# Patient Record
Sex: FEMALE | Race: WHITE | NOT HISPANIC OR LATINO | Employment: OTHER | URBAN - METROPOLITAN AREA
[De-identification: names, ages, dates, MRNs, and addresses within clinical notes are randomized per-mention and may not be internally consistent; named-entity substitution may affect disease eponyms.]

---

## 2017-02-08 PROBLEM — I50.30: Chronic | Status: ACTIVE | Noted: 2017-02-08

## 2017-02-08 PROBLEM — M85.80 OSTEOPENIA DETERMINED BY X-RAY: Chronic | Status: ACTIVE | Noted: 2017-02-08

## 2017-02-08 PROBLEM — K76.0 FATTY LIVER DISEASE, NONALCOHOLIC: Chronic | Status: ACTIVE | Noted: 2017-02-08

## 2017-02-08 PROBLEM — I42.9: Chronic | Status: ACTIVE | Noted: 2017-02-08

## 2017-02-08 PROBLEM — I27.20 PULMONARY HTN: Status: ACTIVE | Noted: 2017-02-08

## 2017-02-09 PROBLEM — N18.9 ANEMIA IN CHRONIC KIDNEY DISEASE(285.21): Status: ACTIVE | Noted: 2017-02-09

## 2017-02-09 PROBLEM — I27.20 PULMONARY HTN: Chronic | Status: ACTIVE | Noted: 2017-02-08

## 2017-02-09 PROBLEM — D63.1 ANEMIA IN CHRONIC KIDNEY DISEASE(285.21): Status: ACTIVE | Noted: 2017-02-09

## 2017-02-09 PROBLEM — R74.8 ABNORMAL LIVER ENZYMES: Status: ACTIVE | Noted: 2017-02-09

## 2017-02-10 PROBLEM — D63.1 ANEMIA IN CHRONIC KIDNEY DISEASE(285.21): Status: ACTIVE | Noted: 2017-02-10

## 2017-02-10 PROBLEM — N18.9 ANEMIA IN CHRONIC KIDNEY DISEASE(285.21): Status: ACTIVE | Noted: 2017-02-10

## 2017-02-10 PROBLEM — R74.8 ABNORMAL LIVER ENZYMES: Status: ACTIVE | Noted: 2017-02-10

## 2017-02-12 PROBLEM — R00.1 BRADYCARDIA, DRUG INDUCED: Status: ACTIVE | Noted: 2017-02-12

## 2017-02-12 PROBLEM — T50.905A BRADYCARDIA, DRUG INDUCED: Status: ACTIVE | Noted: 2017-02-12

## 2017-02-13 PROBLEM — R00.1 BRADYCARDIA: Status: ACTIVE | Noted: 2017-02-13

## 2017-02-13 PROBLEM — E44.0 MALNUTRITION OF MODERATE DEGREE: Status: ACTIVE | Noted: 2017-02-13

## 2017-02-17 PROBLEM — E63.8 INADEQUATE DIETARY INTAKE OF PROTEIN: Status: ACTIVE | Noted: 2017-02-17

## 2017-02-24 PROBLEM — D63.1 ANEMIA IN CHRONIC KIDNEY DISEASE(285.21): Chronic | Status: ACTIVE | Noted: 2017-02-10

## 2017-02-24 PROBLEM — N18.9 ANEMIA IN CHRONIC KIDNEY DISEASE(285.21): Chronic | Status: ACTIVE | Noted: 2017-02-10

## 2017-03-27 PROBLEM — E83.52 HYPERCALCEMIA: Status: ACTIVE | Noted: 2017-03-27

## 2017-03-27 PROBLEM — E63.9 INADEQUATE DIETARY ENERGY INTAKE: Status: ACTIVE | Noted: 2017-03-27

## 2017-03-27 PROBLEM — K92.2 GASTROINTESTINAL HEMORRHAGE: Status: ACTIVE | Noted: 2017-03-27

## 2017-03-27 PROBLEM — E87.5 HYPERKALEMIA: Status: ACTIVE | Noted: 2017-03-27

## 2017-03-28 PROBLEM — R33.9 URINARY RETENTION: Status: ACTIVE | Noted: 2017-03-28

## 2017-03-28 PROBLEM — R82.71 BACTERIURIA WITH PYURIA: Status: ACTIVE | Noted: 2017-03-28

## 2017-03-28 PROBLEM — R82.81 BACTERIURIA WITH PYURIA: Status: ACTIVE | Noted: 2017-03-28

## 2017-03-29 PROBLEM — E87.5 HYPERKALEMIA: Status: RESOLVED | Noted: 2017-03-27 | Resolved: 2017-03-29

## 2017-03-29 PROBLEM — N39.0 UTI (URINARY TRACT INFECTION): Status: ACTIVE | Noted: 2017-03-28

## 2017-03-29 PROBLEM — E83.52 HYPERCALCEMIA: Status: RESOLVED | Noted: 2017-03-27 | Resolved: 2017-03-29

## 2017-03-31 PROBLEM — K92.2 GASTROINTESTINAL HEMORRHAGE: Status: RESOLVED | Noted: 2017-03-27 | Resolved: 2017-03-31

## 2017-04-18 PROBLEM — L08.9 WOUND INFECTION: Status: ACTIVE | Noted: 2017-04-18

## 2017-04-18 PROBLEM — T14.8XXA WOUND INFECTION: Status: ACTIVE | Noted: 2017-04-18

## 2017-04-21 PROBLEM — M86.172 ACUTE OSTEOMYELITIS OF LEFT CALCANEUS: Status: ACTIVE | Noted: 2017-04-21

## 2017-08-15 PROBLEM — N28.9 ACUTE RENAL INSUFFICIENCY: Status: ACTIVE | Noted: 2017-08-15

## 2017-08-15 PROBLEM — M00.9 SEPTIC JOINT: Status: ACTIVE | Noted: 2017-08-15

## 2017-08-16 PROBLEM — E87.20 METABOLIC ACIDOSIS, NORMAL ANION GAP (NAG): Status: ACTIVE | Noted: 2017-08-16

## 2017-08-16 PROBLEM — L89.624 PRESSURE ULCER OF LEFT HEEL, STAGE 4: Status: ACTIVE | Noted: 2017-08-16

## 2017-08-16 PROBLEM — A04.72 C. DIFFICILE COLITIS: Status: ACTIVE | Noted: 2017-08-16

## 2017-08-16 PROBLEM — L89.890: Status: ACTIVE | Noted: 2017-08-16

## 2017-08-16 PROBLEM — L89.894: Status: ACTIVE | Noted: 2017-08-16

## 2017-08-16 PROBLEM — L89.93 PRESSURE ULCER, STAGE 3: Status: ACTIVE | Noted: 2017-08-16

## 2017-08-21 PROBLEM — E87.6 HYPOKALEMIA: Status: ACTIVE | Noted: 2017-08-21

## 2017-08-22 PROBLEM — M86.672 CHRONIC OSTEOMYELITIS INVOLVING LEFT ANKLE AND FOOT: Status: ACTIVE | Noted: 2017-04-21

## 2017-08-22 PROBLEM — L89.894: Status: ACTIVE | Noted: 2017-08-22

## 2017-08-22 PROBLEM — N28.9 ACUTE RENAL INSUFFICIENCY: Status: RESOLVED | Noted: 2017-08-15 | Resolved: 2017-08-22

## 2017-09-03 PROBLEM — I21.4 NSTEMI (NON-ST ELEVATED MYOCARDIAL INFARCTION): Status: ACTIVE | Noted: 2017-09-03

## 2017-09-04 PROBLEM — G93.40 ENCEPHALOPATHY: Status: ACTIVE | Noted: 2017-09-04

## 2017-09-07 PROBLEM — J90 BILATERAL PLEURAL EFFUSION: Status: ACTIVE | Noted: 2017-09-07

## 2018-02-19 PROBLEM — N39.0 URINARY TRACT INFECTION WITHOUT HEMATURIA: Status: ACTIVE | Noted: 2018-02-19

## 2018-02-20 PROBLEM — E43 SEVERE MALNUTRITION: Status: ACTIVE | Noted: 2018-02-20

## 2018-02-20 PROBLEM — R50.9 FEVER: Status: ACTIVE | Noted: 2018-02-20

## 2018-02-20 PROBLEM — I42.9: Status: ACTIVE | Noted: 2017-02-08

## 2018-02-20 PROBLEM — E86.0 DEHYDRATION: Status: ACTIVE | Noted: 2018-02-20

## 2018-02-20 PROBLEM — I50.30: Status: ACTIVE | Noted: 2017-02-08

## 2018-02-21 PROBLEM — L89.90 PRESSURE ULCER: Status: ACTIVE | Noted: 2018-02-21

## 2018-02-21 PROBLEM — T14.8XXA DEEP TISSUE INJURY: Status: ACTIVE | Noted: 2018-02-21

## 2018-02-23 PROBLEM — D64.9 NORMOCYTIC ANEMIA: Status: ACTIVE | Noted: 2018-02-23

## 2018-03-03 PROBLEM — N12 PYELONEPHRITIS: Status: ACTIVE | Noted: 2018-03-03

## 2018-03-04 PROBLEM — J96.02 ACUTE RESPIRATORY FAILURE WITH HYPOXIA AND HYPERCARBIA: Status: ACTIVE | Noted: 2018-03-04

## 2018-03-04 PROBLEM — J96.01 ACUTE RESPIRATORY FAILURE WITH HYPOXIA AND HYPERCARBIA: Status: ACTIVE | Noted: 2018-03-04

## 2018-03-05 PROBLEM — R05.9 COUGH: Status: ACTIVE | Noted: 2018-03-05

## 2018-03-05 PROBLEM — R40.4 ALTERED AWARENESS, TRANSIENT: Status: ACTIVE | Noted: 2018-03-05

## 2018-03-06 PROBLEM — L25.8 DERMATITIS ASSOCIATED WITH INCONTINENCE: Status: ACTIVE | Noted: 2018-03-06

## 2018-03-06 PROBLEM — L89.894: Status: ACTIVE | Noted: 2018-03-06

## 2018-03-06 PROBLEM — R32 DERMATITIS ASSOCIATED WITH INCONTINENCE: Status: ACTIVE | Noted: 2018-03-06

## 2018-03-06 PROBLEM — L89.92 PRESSURE INJURY OF SKIN, STAGE 2: Status: ACTIVE | Noted: 2018-03-06

## 2018-03-06 PROBLEM — L89.620 UNSTAGEABLE PRESSURE ULCER OF LEFT HEEL: Status: ACTIVE | Noted: 2018-03-06

## 2018-03-06 PROBLEM — L89.893: Status: ACTIVE | Noted: 2018-03-06

## 2018-03-09 PROBLEM — E87.0 HYPERNATREMIA: Status: ACTIVE | Noted: 2018-03-09

## 2018-03-10 PROBLEM — E87.0 HYPERNATREMIA: Chronic | Status: ACTIVE | Noted: 2018-03-09

## 2018-03-10 PROBLEM — D64.9 NORMOCYTIC ANEMIA: Chronic | Status: ACTIVE | Noted: 2018-02-23

## 2018-03-10 PROBLEM — E43 SEVERE MALNUTRITION: Chronic | Status: ACTIVE | Noted: 2018-02-20

## 2018-03-13 PROBLEM — N18.30 ACUTE RENAL FAILURE SUPERIMPOSED ON STAGE 3 CHRONIC KIDNEY DISEASE: Status: ACTIVE | Noted: 2018-03-13

## 2018-03-13 PROBLEM — N17.9 ACUTE RENAL FAILURE SUPERIMPOSED ON STAGE 3 CHRONIC KIDNEY DISEASE: Status: ACTIVE | Noted: 2018-03-13

## 2018-03-17 PROBLEM — N39.0 UTI (URINARY TRACT INFECTION): Status: ACTIVE | Noted: 2018-03-17

## 2018-03-18 PROBLEM — N18.30 ACUTE RENAL FAILURE SUPERIMPOSED ON STAGE 3 CHRONIC KIDNEY DISEASE: Status: RESOLVED | Noted: 2018-03-13 | Resolved: 2018-03-18

## 2018-03-18 PROBLEM — N17.9 ACUTE RENAL FAILURE SUPERIMPOSED ON STAGE 3 CHRONIC KIDNEY DISEASE: Status: RESOLVED | Noted: 2018-03-13 | Resolved: 2018-03-18

## 2018-04-27 PROBLEM — R53.81 PHYSICAL DECONDITIONING: Chronic | Status: ACTIVE | Noted: 2018-04-27

## 2018-08-11 PROBLEM — N30.01 ACUTE CYSTITIS WITH HEMATURIA: Status: ACTIVE | Noted: 2018-08-11

## 2018-08-12 PROBLEM — E43 SEVERE PROTEIN-CALORIE MALNUTRITION: Status: ACTIVE | Noted: 2017-02-17

## 2018-08-12 PROBLEM — R65.20 SEPSIS DUE TO GRAM-NEGATIVE ORGANISM WITH ACUTE RENAL FAILURE: Status: ACTIVE | Noted: 2018-08-12

## 2018-08-12 PROBLEM — N17.9 SEPSIS DUE TO GRAM-NEGATIVE ORGANISM WITH ACUTE RENAL FAILURE: Status: ACTIVE | Noted: 2018-08-12

## 2018-08-12 PROBLEM — A41.50 SEPSIS DUE TO GRAM-NEGATIVE ORGANISM WITH ACUTE RENAL FAILURE: Status: ACTIVE | Noted: 2018-08-12

## 2018-08-13 PROBLEM — R21 RASH: Status: ACTIVE | Noted: 2018-08-13

## 2018-08-13 PROBLEM — L89.94 PRESSURE INJURY OF SKIN AND DEEP TISSUE, STAGE 4: Status: ACTIVE | Noted: 2018-08-13

## 2018-08-13 PROBLEM — L89.90 PRESSURE INJURY OF SKIN: Status: ACTIVE | Noted: 2018-08-13

## 2018-08-13 PROBLEM — R41.82 ALTERED MENTAL STATUS: Status: ACTIVE | Noted: 2018-03-05

## 2018-08-14 PROBLEM — R63.8 INCREASED NUTRITIONAL NEEDS: Status: ACTIVE | Noted: 2018-08-14

## 2018-08-16 PROBLEM — R63.8 ALTERATION IN NUTRITION: Status: ACTIVE | Noted: 2018-08-16

## 2018-08-17 PROBLEM — R78.81 BACTEREMIA DUE TO ESCHERICHIA COLI: Status: ACTIVE | Noted: 2018-08-12

## 2018-08-17 PROBLEM — B96.20 BACTEREMIA DUE TO ESCHERICHIA COLI: Status: ACTIVE | Noted: 2018-08-12

## 2018-09-29 PROBLEM — N39.0 UTI (URINARY TRACT INFECTION): Status: RESOLVED | Noted: 2018-03-17 | Resolved: 2018-09-29

## 2018-09-29 PROBLEM — L89.92 PRESSURE INJURY OF SKIN, STAGE 2: Status: RESOLVED | Noted: 2018-03-06 | Resolved: 2018-09-29

## 2018-09-29 PROBLEM — L25.8 DERMATITIS ASSOCIATED WITH INCONTINENCE: Status: RESOLVED | Noted: 2018-03-06 | Resolved: 2018-09-29

## 2018-09-29 PROBLEM — R05.9 COUGH: Status: RESOLVED | Noted: 2018-03-05 | Resolved: 2018-09-29

## 2018-09-29 PROBLEM — L89.90 PRESSURE INJURY OF SKIN: Status: RESOLVED | Noted: 2018-08-13 | Resolved: 2018-09-29

## 2018-09-29 PROBLEM — T14.8XXA DEEP TISSUE INJURY: Status: RESOLVED | Noted: 2018-02-21 | Resolved: 2018-09-29

## 2018-09-29 PROBLEM — R63.8 ALTERATION IN NUTRITION: Status: RESOLVED | Noted: 2018-08-16 | Resolved: 2018-09-29

## 2018-09-29 PROBLEM — R50.9 FEVER: Status: RESOLVED | Noted: 2018-02-20 | Resolved: 2018-09-29

## 2018-09-29 PROBLEM — M00.9 SEPTIC JOINT: Status: RESOLVED | Noted: 2017-08-15 | Resolved: 2018-09-29

## 2018-09-29 PROBLEM — R78.81 BACTEREMIA DUE TO ESCHERICHIA COLI: Status: RESOLVED | Noted: 2018-08-12 | Resolved: 2018-09-29

## 2018-09-29 PROBLEM — R63.8 INCREASED NUTRITIONAL NEEDS: Status: RESOLVED | Noted: 2018-08-14 | Resolved: 2018-09-29

## 2018-09-29 PROBLEM — E87.20 METABOLIC ACIDOSIS, NORMAL ANION GAP (NAG): Status: RESOLVED | Noted: 2017-08-16 | Resolved: 2018-09-29

## 2018-09-29 PROBLEM — R21 RASH: Status: RESOLVED | Noted: 2018-08-13 | Resolved: 2018-09-29

## 2018-09-29 PROBLEM — E87.0 HYPERNATREMIA: Status: RESOLVED | Noted: 2018-03-09 | Resolved: 2018-09-29

## 2018-09-29 PROBLEM — R32 DERMATITIS ASSOCIATED WITH INCONTINENCE: Status: RESOLVED | Noted: 2018-03-06 | Resolved: 2018-09-29

## 2018-09-29 PROBLEM — L89.620 UNSTAGEABLE PRESSURE ULCER OF LEFT HEEL: Status: RESOLVED | Noted: 2018-03-06 | Resolved: 2018-09-29

## 2018-09-29 PROBLEM — R41.82 ALTERED MENTAL STATUS: Status: RESOLVED | Noted: 2018-03-05 | Resolved: 2018-09-29

## 2018-09-29 PROBLEM — N30.01 ACUTE CYSTITIS WITH HEMATURIA: Status: RESOLVED | Noted: 2018-08-11 | Resolved: 2018-09-29

## 2018-09-29 PROBLEM — A04.72 C. DIFFICILE COLITIS: Status: RESOLVED | Noted: 2017-08-16 | Resolved: 2018-09-29

## 2018-09-29 PROBLEM — N12 PYELONEPHRITIS: Status: RESOLVED | Noted: 2018-03-03 | Resolved: 2018-09-29

## 2018-09-29 PROBLEM — I21.4 NSTEMI (NON-ST ELEVATED MYOCARDIAL INFARCTION): Status: RESOLVED | Noted: 2017-09-03 | Resolved: 2018-09-29

## 2018-09-29 PROBLEM — B96.20 BACTEREMIA DUE TO ESCHERICHIA COLI: Status: RESOLVED | Noted: 2018-08-12 | Resolved: 2018-09-29

## 2018-09-29 PROBLEM — E87.6 HYPOKALEMIA: Status: RESOLVED | Noted: 2017-08-21 | Resolved: 2018-09-29

## 2018-10-10 PROBLEM — L25.8 INCONTINENCE ASSOCIATED DERMATITIS: Status: ACTIVE | Noted: 2018-10-10

## 2018-10-10 PROBLEM — L89.894: Status: ACTIVE | Noted: 2018-10-10

## 2018-10-10 PROBLEM — L89.92 PRESSURE INJURY, STAGE 2: Status: ACTIVE | Noted: 2018-10-10

## 2018-10-10 PROBLEM — L89.626 PRESSURE INJURY OF DEEP TISSUE OF LEFT HEEL: Status: ACTIVE | Noted: 2018-10-10

## 2018-10-10 PROBLEM — R32 INCONTINENCE ASSOCIATED DERMATITIS: Status: ACTIVE | Noted: 2018-10-10

## 2018-10-15 PROBLEM — N39.0 URINARY TRACT INFECTION: Status: RESOLVED | Noted: 2017-03-28 | Resolved: 2018-10-15

## 2018-10-15 PROBLEM — J18.9 PNEUMONIA DUE TO INFECTIOUS ORGANISM: Status: ACTIVE | Noted: 2018-10-15

## 2018-10-15 PROBLEM — T83.511A URINARY TRACT INFECTION ASSOCIATED WITH INDWELLING URETHRAL CATHETER: Status: ACTIVE | Noted: 2018-02-19

## 2018-10-17 PROBLEM — E87.0 HYPERNATREMIA: Status: RESOLVED | Noted: 2018-03-09 | Resolved: 2018-10-17

## 2018-10-19 PROBLEM — R78.81 POSITIVE BLOOD CULTURES: Status: ACTIVE | Noted: 2018-10-19

## 2018-10-29 ENCOUNTER — TELEPHONE (OUTPATIENT)
Dept: FAMILY MEDICINE | Facility: CLINIC | Age: 83
End: 2018-10-29

## 2018-10-29 NOTE — TELEPHONE ENCOUNTER
DERMATOLOGY  MINOR WOUND CARE      You may shower immediately following the procedure.    Wound care for 4-5 days:  • Wash your hands with soap and water.  • Wash area gently one time daily with soap and water.  • Dry area with clean towel.  • Apply Vaseline and a Band-Aid, if needed.    Notify the clinic if any of the following occur:  • Increased redness or swelling that continues to spread.  • Pus draining from wound.  • Fever, chills, flu-like symptoms.  • Bleeding that persists despite FIRM PRESSURE applied for 2 sessions of 15 CONTINUOUS minutes, NO PEEKING!!    What if I am having some discomfort?  Following minor procedure, the discomfort is usually mild.  You may use Tylenol, Extra Strength Tylenol, Motrin, Aleve, Naproxen or Ibuprofen.    If you are already taking daily aspirin or other blood thinners, you do NOT have to discontinue your daily use.      For any concerns, call the Dermatology clinic at:  • [phone], during business hours Monday-Friday  • [phone], evenings after 5 pm, weekends, holidays  OR  If you have not received your biopsy results within 2 weeks after the procedure      (rev 9/14)         Spoke with Kat with  home. She's requesting that you sign pt's death certificate. Kat stated that the death certificate was sent electronically on 10/25/18 via the [employer] system.

## 2018-10-29 NOTE — TELEPHONE ENCOUNTER
----- Message from Roseline Manriquez sent at 10/29/2018  8:39 AM CDT -----  Contact: [facility]/[phone]  The are calling in reference to getting a death certificate signed.

## 2018-10-30 NOTE — TELEPHONE ENCOUNTER
----- Message from Rachel Greenberg sent at 10/30/2018  8:09 AM CDT -----  Contact: Kat- formerly [facility]/ [phone]  Kat called to let you know they are still waiting for the patient's death certificate to be sign.    Please call.